# Patient Record
Sex: FEMALE | Race: WHITE | NOT HISPANIC OR LATINO | URBAN - METROPOLITAN AREA
[De-identification: names, ages, dates, MRNs, and addresses within clinical notes are randomized per-mention and may not be internally consistent; named-entity substitution may affect disease eponyms.]

---
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
## 2021-09-09 ENCOUNTER — TELEPHONE (OUTPATIENT)
Dept: NEUROLOGY | Facility: CLINIC | Age: 21
End: 2021-09-09

## 2021-09-09 NOTE — TELEPHONE ENCOUNTER
ADD ON - patient is scheduled with Dr Lidia Fernández on 9/10 @ 8a - insurance is [employer] [id number] please help me to add insurance and verify

## 2021-09-09 NOTE — TELEPHONE ENCOUNTER
Best contact number for patient:  [phone]  Emergency Contact name and number:    Referring provider and telephone number:  SELF REFERRAL  Primary Care Provider Name and if affiliated with [facility]:     Reason for Appointment/Dx:  Restless legs/elevated creatinine kinase  Have you seen and followed up with a pediatric Neurologist for this disease in the past?      No (If yes ok to schedule with Dr Rand Cook)    Neurology Location patient would like to be seen:    Order received? No                                                 Records Received? Yes    Have you ever seen another Neurologist?       No    Insurance Information    Insurance Name:  Ezio Kenyon  ID/Policy #:  [id number]  Secondary Insurance:    ID/Policy#: Workman's Comp/ Accident/ School  Information      Workman's Comp/Accident/School related? No    If yes name of Insurance company:    Claim #:    Date of Injury:    Type of Injury:     Name and Telephone Number:    Notes:                   Appointment date:   NP appt made with Dr Rand Cook in [place] on 9/10 @ 8a

## 2021-10-25 PROCEDURE — 87205 SMEAR GRAM STAIN: CPT | Performed by: OTOLARYNGOLOGY

## 2021-10-25 PROCEDURE — 87070 CULTURE OTHR SPECIMN AEROBIC: CPT | Performed by: OTOLARYNGOLOGY

## 2023-11-06 NOTE — MISCELLANEOUS
Message  Return to Physical Activity:   Note To Certified Athletic Trainer   The above patient was seen in our office recently  Due to a concussion we recommend: No Physical Activity  Graded return to play as per the [facility] Guidelines:   1  No Physical Activity   2  Light aerobic activity (walking, swimming, stationary bike)   3  Sport-specific activity (non-contact)   4  Non-contact training drills (more complex drills and resistance training)   5  Full contact practice   6  Normal game   If symptoms occur at any level, drop back to prior level  We will see the athlete back in: 1 week  Please contact our office if you have any questions  Return to work or school [place] [employer]:   Jonathan Bolivar is under my professional care  She was seen in my office on 1/15/18     She is able to return to school full day on      Please allow for breaks during class if there are worsening symptoms  Please excuse from testing until cleared by this office  Harmeet Torrez DO  Signatures   Electronically signed by : Harmeet Torrez DO; Waqas 15 2018  4:37PM EST                       (Author)    Electronically signed by : HELIO Martinez ; Waqas 15 2018  5:10PM EST                       (Author)    Electronically signed by :  HELIO Martinez ; Waqas 15 2018  5:12PM EST                       (Author) Eye Shield Used: No